# Patient Record
Sex: MALE | Employment: STUDENT | URBAN - METROPOLITAN AREA
[De-identification: names, ages, dates, MRNs, and addresses within clinical notes are randomized per-mention and may not be internally consistent; named-entity substitution may affect disease eponyms.]

---
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
## 2025-06-04 ENCOUNTER — TELEPHONE (OUTPATIENT)
Age: 20
End: 2025-06-04

## 2025-06-04 NOTE — TELEPHONE ENCOUNTER
Analia from from [facility] in [place], [place] Contacted office stating they are looking for OP services for patient. Analia stated patient at this time does not have any active insurance and according to the website [facility] will help patient obtain [facility] funding to receive services. Writer notified CM they are unsure given that PF is still fairly new but they are sure [facility] usually will not be able to help through op until patient is active but given the uncertainty writer will send a message to the PF facility to assist and they can reach out with more definitive information. Writer did speak with patient to obtain consent to speak with cm. Cm provided basic information to assist with determination process:  Expected discharge: 6/5 or 6/6   Admitted Diagnosis : mood disorder, alcohol abuse  Admission date: 6/1/25   Insurance: No active insurance    After obtaining d/x writer did provide cm with information for  [facility] program ([phone]) to reach out as well and see about options through the facility given patient's alcohol abuse. Cm showed verbal understanding